# Patient Record
Sex: MALE | Race: WHITE
[De-identification: names, ages, dates, MRNs, and addresses within clinical notes are randomized per-mention and may not be internally consistent; named-entity substitution may affect disease eponyms.]

---

## 2018-04-28 ENCOUNTER — HOSPITAL ENCOUNTER (EMERGENCY)
Dept: HOSPITAL 89 - ER | Age: 43
LOS: 1 days | Discharge: ANOTHER HOSPITAL | End: 2018-04-29
Payer: SELF-PAY

## 2018-04-28 ENCOUNTER — HOSPITAL ENCOUNTER (OUTPATIENT)
Dept: HOSPITAL 89 - AMB | Age: 43
End: 2018-04-28
Payer: SELF-PAY

## 2018-04-28 DIAGNOSIS — D72.829: ICD-10-CM

## 2018-04-28 DIAGNOSIS — R94.31: ICD-10-CM

## 2018-04-28 DIAGNOSIS — N17.9: ICD-10-CM

## 2018-04-28 DIAGNOSIS — I46.9: Primary | ICD-10-CM

## 2018-04-28 DIAGNOSIS — I48.91: ICD-10-CM

## 2018-04-28 DIAGNOSIS — J96.90: Primary | ICD-10-CM

## 2018-04-28 DIAGNOSIS — Z86.711: ICD-10-CM

## 2018-04-28 LAB
INR PPP: 1.46
PLATELET COUNT, AUTOMATED: 64 K/UL (ref 150–450)

## 2018-04-28 PROCEDURE — 71045 X-RAY EXAM CHEST 1 VIEW: CPT

## 2018-04-28 PROCEDURE — 84484 ASSAY OF TROPONIN QUANT: CPT

## 2018-04-28 PROCEDURE — 80305 DRUG TEST PRSMV DIR OPT OBS: CPT

## 2018-04-28 PROCEDURE — 96375 TX/PRO/DX INJ NEW DRUG ADDON: CPT

## 2018-04-28 PROCEDURE — 82140 ASSAY OF AMMONIA: CPT

## 2018-04-28 PROCEDURE — 86920 COMPATIBILITY TEST SPIN: CPT

## 2018-04-28 PROCEDURE — 93005 ELECTROCARDIOGRAM TRACING: CPT

## 2018-04-28 PROCEDURE — 82374 ASSAY BLOOD CARBON DIOXIDE: CPT

## 2018-04-28 PROCEDURE — 82310 ASSAY OF CALCIUM: CPT

## 2018-04-28 PROCEDURE — 84460 ALANINE AMINO (ALT) (SGPT): CPT

## 2018-04-28 PROCEDURE — 36415 COLL VENOUS BLD VENIPUNCTURE: CPT

## 2018-04-28 PROCEDURE — 82435 ASSAY OF BLOOD CHLORIDE: CPT

## 2018-04-28 PROCEDURE — 85610 PROTHROMBIN TIME: CPT

## 2018-04-28 PROCEDURE — 85025 COMPLETE CBC W/AUTO DIFF WBC: CPT

## 2018-04-28 PROCEDURE — 82565 ASSAY OF CREATININE: CPT

## 2018-04-28 PROCEDURE — 84155 ASSAY OF PROTEIN SERUM: CPT

## 2018-04-28 PROCEDURE — 86850 RBC ANTIBODY SCREEN: CPT

## 2018-04-28 PROCEDURE — 83880 ASSAY OF NATRIURETIC PEPTIDE: CPT

## 2018-04-28 PROCEDURE — 86901 BLOOD TYPING SEROLOGIC RH(D): CPT

## 2018-04-28 PROCEDURE — 83690 ASSAY OF LIPASE: CPT

## 2018-04-28 PROCEDURE — 82803 BLOOD GASES ANY COMBINATION: CPT

## 2018-04-28 PROCEDURE — 85379 FIBRIN DEGRADATION QUANT: CPT

## 2018-04-28 PROCEDURE — 99285 EMERGENCY DEPT VISIT HI MDM: CPT

## 2018-04-28 PROCEDURE — 84295 ASSAY OF SERUM SODIUM: CPT

## 2018-04-28 PROCEDURE — 36600 WITHDRAWAL OF ARTERIAL BLOOD: CPT

## 2018-04-28 PROCEDURE — 87077 CULTURE AEROBIC IDENTIFY: CPT

## 2018-04-28 PROCEDURE — 87040 BLOOD CULTURE FOR BACTERIA: CPT

## 2018-04-28 PROCEDURE — 82150 ASSAY OF AMYLASE: CPT

## 2018-04-28 PROCEDURE — 99291 CRITICAL CARE FIRST HOUR: CPT

## 2018-04-28 PROCEDURE — 99292 CRITICAL CARE ADDL 30 MIN: CPT

## 2018-04-28 PROCEDURE — 82247 BILIRUBIN TOTAL: CPT

## 2018-04-28 PROCEDURE — 84520 ASSAY OF UREA NITROGEN: CPT

## 2018-04-28 PROCEDURE — 82375 ASSAY CARBOXYHB QUANT: CPT

## 2018-04-28 PROCEDURE — 87186 SC STD MICRODIL/AGAR DIL: CPT

## 2018-04-28 PROCEDURE — 83605 ASSAY OF LACTIC ACID: CPT

## 2018-04-28 PROCEDURE — 84075 ASSAY ALKALINE PHOSPHATASE: CPT

## 2018-04-28 PROCEDURE — 82550 ASSAY OF CK (CPK): CPT

## 2018-04-28 PROCEDURE — 84132 ASSAY OF SERUM POTASSIUM: CPT

## 2018-04-28 PROCEDURE — 84450 TRANSFERASE (AST) (SGOT): CPT

## 2018-04-28 PROCEDURE — 82274 ASSAY TEST FOR BLOOD FECAL: CPT

## 2018-04-28 PROCEDURE — 82040 ASSAY OF SERUM ALBUMIN: CPT

## 2018-04-28 PROCEDURE — 82947 ASSAY GLUCOSE BLOOD QUANT: CPT

## 2018-04-28 PROCEDURE — 96374 THER/PROPH/DIAG INJ IV PUSH: CPT

## 2018-04-28 PROCEDURE — 94002 VENT MGMT INPAT INIT DAY: CPT

## 2018-04-28 PROCEDURE — 80320 DRUG SCREEN QUANTALCOHOLS: CPT

## 2018-04-28 PROCEDURE — 84443 ASSAY THYROID STIM HORMONE: CPT

## 2018-04-28 PROCEDURE — 81001 URINALYSIS AUTO W/SCOPE: CPT

## 2018-04-28 PROCEDURE — 85730 THROMBOPLASTIN TIME PARTIAL: CPT

## 2018-04-28 PROCEDURE — 86900 BLOOD TYPING SEROLOGIC ABO: CPT

## 2018-04-28 NOTE — RADIOLOGY IMAGING REPORT
FACILITY: [facility] [place] 

 

PATIENT NAME: Parish Leal

: 1976

MR: [id number]

V: [id number]

EXAM DATE: 

ORDERING PHYSICIAN: ADAMA LIEBERMAN

TECHNOLOGIST: 

 

Location: [facility] [place]

Patient: Parish Leal

: 1976

MRN: [id number]

Visit/Account:[id number]

Date of Sevice:  2018

 

ACCESSION #: [id number]

 

CHEST SINGLE AP

 

Indication: Cardiac arrest

 

Comparison: None available

 

Findings:

There are numerous densities overlying the patient.

Given the limitations of the overlying densities, suspected atelectasis is seen within the right lung
 base. No infiltrate is seen.

No pneumothorax or definitive pleural effusion.

Heart size and mediastinal contours are mildly prominent which may reflect AP portable technique and 
small lung volumes.

The patient is intubated. Endotracheal tube tip is  6.3 cm above the marjorie.

 

IMPRESSION:

1. Limited exam due to multiple overlying densities. No gross infiltrate, effusion or pneumothorax.

2. Prominent heart and mediastinal contours may be related to portable AP technique and small lung vo
lumes.

 

Report Dictated By: Haseeb Martinez at 2018 10:51 PM

 

Report E-Signed By: Haseeb Martinez  at 2018 10:54 PM

 

WSN:[id number]

## 2018-04-28 NOTE — ER REPORT
History and Physical


Time Seen By MD:  22:26


HPI/ROS


CHIEF COMPLAINT: Cardiac arrest, found down unresponsive





HISTORY OF PRESENT ILLNESS: 42-year-old male with a history of blood clotts 

with shortness of breath for 2 days was having difficulty breathing.  Family 

reports patient had difficulty swallowing for the last 2 days.  Had very little 

by mouth intake.  EMS arrived, found him with agonal respirations and a brief 

episode of cardiac arrest.  They initiated CPR, intubated the patient.  They 

gave 3 rounds of epinephrine, dextrose 50% 1 amp and Narcan 2 mg IV.  On arrival

, patient appears mottled and cyanotic diffusely.  He has a rate of 144, and 

the monitor.  He is hypotensive.  EMS initiated a dopamine drip in the field.  

They did get her pressure up to 115/86.  Patient's had no purposeful movement.  

Patient has no previous records here at [facility].





REVIEW OF SYSTEMS:


Unable to obtain due to unresponsive patient.


Allergies:  


Coded Allergies:  


     Penicillins (Verified  Allergy, Unknown, 18)


Past Medical/Surgical History


Pulmonary embolism, toe amputation


Reviewed Nurses Notes:  Yes


Constitutional





Vital Sign - Last 24 Hours








 18





 22:26 22:27 22:28 22:29


 


Pulse 122 ??? ??? 122


 


Resp 14  10 14


 


B/P (MAP)  74/38 (50)  94/42 (59)





 18





 22:30 22:30 22:31 22:32


 


Pulse 134  108 133


 


Resp 11  9 12


 


B/P (MAP) 36/25 (29)   79/63 (68)


 


FiO2  90.0  





 18





 22:33 22:34 22:35 22:36


 


Pulse ??? 110 131 130


 


Resp 13 17 16 31


 


B/P (MAP)  141/97 (112)  





 18





 22:37 22:38 22:39 22:40


 


Pulse 113 105 ??? 110


 


Resp 12 11 13 13


 


B/P (MAP)    49/33 (38)





 4/28/18 4/28/18 4/28/18 4/28/18





 22:41 22:42 22:43 22:44


 


Pulse 112 112 118 93


 


Resp 16 21 15 15


 


B/P (MAP)   59/28 (38) ???/??? (1665)





 18





 22:45 22:46 22:47 22:48


 


Pulse 112 105 108 101


 


Resp 15 15 16 16


 


B/P (MAP) 76/42 (53)   





 18





 22:49 22:50 22:51 22:52


 


Pulse 120 105 101 105


 


Resp 16 17 16 18


 


B/P (MAP) 95/68 (77)   





 18





 22:53 22:54 22:55 22:56


 


Pulse 108 108 98 112


 


Resp 17 16 16 20


 


B/P (MAP)    87/62 (70)





 18





 22:57 22:58 22:59 23:00


 


Pulse 121 105 113 116


 


Resp 17 17 16 17


 


B/P (MAP)    81/37 (52)


 


Pulse Ox 99 99 99 100





 18





 23:01 23:02 23:03 23:04


 


Pulse 112 109 110 117


 


Resp 18 17 17 17


 


B/P (MAP) 92/59 (70)   86/35 (52)


 


Pulse Ox 99 99 98 97





 18





 23:05 23:06 23:07 23:08


 


Pulse 118 118 121 112


 


Resp 16 16 16 16


 


B/P (MAP)    59/46 (50)


 


Pulse Ox 96 96 96 95





 18





 23:09 23:10 23:11 23:12


 


Pulse 113 111 118 125


 


Resp 16 16 17 17


 


B/P (MAP) 117/49 (71)   85/45 (58)


 


Pulse Ox 94 94 93 92





 18





 23:13 23:14 23:15 23:16


 


Pulse 109 123 118 123


 


Resp 17 17 16 16


 


B/P (MAP)   98/62 (74) 


 


Pulse Ox 92 91 90 91





 18 18 18 18





 23:17 23:18 23:19 23:20


 


Pulse 108 131 115 172


 


Resp 16 17 17 17


 


B/P (MAP)    97/75 (82)


 


Pulse Ox 88 87 85 78





 18 1818 18





 23:21 23:22 23:23 23:24


 


Pulse ??? 116 105 129


 


Resp 17 16 18 19


 


B/P (MAP)    99/66 (77)


 


Pulse Ox 78   





 1818 18





 23:25 23:26 23:27 23:28


 


Pulse 111 112 111 140


 


Resp 18 19 19 18


 


B/P (MAP)   103/84 (90) 





 18





 23:29 23:30 23:31 23:32


 


Pulse 102 103 ??? 103


 


Resp 17 16 18 18


 


B/P (MAP)  77/62 (67) 87/65 (72) 


 


Pulse Ox   92 95





 1818 18 18





 23:33 23:34 23:35 23:36


 


Pulse 110 108 115 116


 


Resp 20 19 18 18


 


B/P (MAP) 87/66 (73)   91/45 (60)


 


Pulse Ox 94 92 92 94





 1818 18





 23:37 23:38 23:39 23:40


 


Pulse 106 113 118 ???


 


Resp 19 22 20 22


 


B/P (MAP)   80/60 (67) 


 


Pulse Ox 93 92 93 92





 1818 18





 23:41 23:42 23:43 23:44


 


Pulse ??? ??? ??? ???


 


Resp 21 20 20 20


 


B/P (MAP)  ???/??? (6015)  


 


Pulse Ox 92 90 91 89





 1818 18





 23:45 23:46 23:47 23:48


 


Pulse ??? ??? 116 117


 


Resp 22 19 20 20


 


B/P (MAP)    109/95 (100)


 


Pulse Ox 90 90 89 90





 18





 23:49 23:50 23:51 23:52


 


Pulse 118 110 126 ???


 


Resp 18 12 10 11


 


B/P (MAP)   126/113 (117) 


 


Pulse Ox 93 93 93 88





 18





 23:53 23:54 23:55 23:56


 


Pulse 115 119 120 119


 


Resp 20 10 20 35


 


B/P (MAP)  102/55 (71)  


 


Pulse Ox  95 96 96





 18





 23:57 23:58 23:59 00:00


 


Pulse 116 103 112 111


 


Resp 18 16 17 17


 


B/P (MAP) 93/60 (71)   113/92 (99)


 


Pulse Ox 95 95 95 96





 18





 00:02 00:03 00:04 00:05


 


Pulse 186 214 178 156


 


Resp 17 16 18 17


 


B/P (MAP)  100/40 (60)  


 


Pulse Ox 95 95 94 94





 18





 00:06 00:07 00:08 00:09


 


Pulse 123 124 112 


 


Resp 17 18 19 


 


B/P (MAP) 117/107 (110)   59/45 (50)


 


Pulse Ox 96 95 95 





 18





 00:10 00:11 00:12 00:15


 


Pulse 114 117 110 114


 


Resp 19 19 18 17


 


Pulse Ox 96 95 97 95





 18





 00:16 00:17 00:18 00:19


 


Pulse 116 110 111 113


 


Resp  19  19


 


B/P (MAP) 50/30 (37)   109/88 (95)


 


Pulse Ox  95  92





 18





 00:20 00:21 00:22 00:23


 


Pulse 104 105 99 


 


Resp 18  18 


 


B/P (MAP)    109/90 (96)


 


Pulse Ox 93  98 





 418





 00:24 00:25 00:27 00:29


 


Pulse 103 112 115 115


 


Resp 20 20 19 19


 


B/P (MAP)   97/75 (82) 


 


Pulse Ox 94 93 96 94





 18





 00:31 00:32 00:33 00:34


 


Pulse 114 138 113 117


 


Resp 20  20 18


 


B/P (MAP) 112/96 (101)  95/87 (90) 


 


Pulse Ox 96 96 96 97





 18





 00:35 00:37 00:39 00:40


 


Pulse 120 118 ??? 117


 


Resp 20 19 21 


 


Pulse Ox 96 96 96 





 18





 00:41 00:42 00:43 00:44


 


Pulse ??? 124 137 127


 


Resp 18 20 19 19


 


B/P (MAP) 95/69 (78)   


 


Pulse Ox 97 96 97 97





 18





 00:45 00:46 00:48 00:49


 


Pulse 126 133 128 114


 


Resp 21 21 20 21


 


B/P (MAP) 77/53 (61)  107/72 (84) 


 


Pulse Ox 99 97 97 97





 18





 00:50 00:51 00:52 00:53


 


Pulse 109 109 107 117


 


Resp 19 20 19 20


 


B/P (MAP)  127/116 (120)  


 


Pulse Ox 98 98 97 97





 18





 00:54 00:55 01:05 01:09


 


Pulse 134 ???  ???


 


B/P (MAP) 96/87 (90)  115/76 (89) 105/89 (94)


 


Pulse Ox 98 89  








Physical Exam


General Appearance: Unresponsive, intubated, placed on ventilator.  P


HEENT: pupils 4 mm and unresponsive.


Respiratory: Chest is non tender, lungs are clear to auscultation.  Breath 

sounds intact bilaterally.  On bagging


Cardiac: regular rate and rhythm, tachycardic, distant


Gastrointestinal: Abdomen is soft and non tender, no masses, bowel sounds 

normal.  No black melenic stools in groin area


Musculoskeletal:  Neck: Neck is supple and non tender.  No lymphadenopathy, no 

thyromegaly


Extremities have full range of motion and are non tender.


Skin: No rashes or lesions.





DIFFERENTIAL DIAGNOSIS: After history and physical exam differential diagnosis 

was considered for altered mental status including but not limited to 

hypoglycemia, infectious process, electrolyte abnormality, head injury and 

intoxicants.  Additionally,shortness of breath including but not limited to 

pulmonary infectious process, COPD, asthma, pulmonary embolus and congestive 

heart failure.





Medical Decision Making


Data Points


Result Diagram:  


18





Laboratory





Hematology








Test


  18


00:00 18


22:21 18


22:44 18


22:47


 


Stool Occult Blood (IFOB)


  Negative


(NEGATIVE) 


  


  


 


 


Amylase Level 66 U/L (0-110)    


 


Lipase


  2621 U/L


() 


  


  


 


 


Urine Opiates Screen Negative    


 


Urine Barbiturates Screen Negative    


 


Ur Tricyclic Antidepressants


Screen Negative 


  


  


  


 


 


Urine Phencyclidine Screen Negative    


 


Urine Amphetamines Screen Positive    


 


Urine Benzodiazepines Screen Negative    


 


Urine Cocaine Screen Negative    


 


Urine Cannabinoids Screen Positive    


 


Carboxyhemoglobin  1.6 % (< 5.0)   


 


Red Blood Count


  


  


  2.87 M/uL


(4.00-5.60) 


 


 


Mean Corpuscular Volume


  


  


  116.1 fL


(80.0-96.0) 


 


 


Mean Corpuscular Hemoglobin


  


  


  37.0 pg


(26.0-33.0) 


 


 


Mean Corpuscular Hemoglobin


Concent 


  


  31.9 g/dL


(32.0-36.0) 


 


 


Red Cell Distribution Width


  


  


  19.0 %


(11.5-14.5) 


 


 


Mean Platelet Volume


  


  


  10.4 fL


(7.2-11.1) 


 


 


Neutrophils (%) (Auto)    % (39.4-72.5)  


 


Lymphocytes (%) (Auto)    % (17.6-49.6)  


 


Monocytes (%) (Auto)    % (4.1-12.4)  


 


Eosinophils (%) (Auto)    % (0.4-6.7)  


 


Basophils (%) (Auto)    % (0.3-1.4)  


 


Nucleated RBC Relative Count


(auto) 


  


   /100WBC 


  


 


 


Neutrophils # (Auto)


  


  


  K/uL


(2.0-7.4) 


 


 


Lymphocytes # (Auto)


  


  


  K/uL


(1.3-3.6) 


 


 


Monocytes # (Auto)


  


  


  K/uL


(0.3-1.0) 


 


 


Eosinophils # (Auto)


  


  


  K/uL


(0.0-0.5) 


 


 


Basophils # (Auto)


  


  


  K/uL


(0.0-0.1) 


 


 


Nucleated RBC Absolute Count


(auto) 


  


   K/uL 


  


 


 


Neutrophils % (Manual)


  


  


  60 %


(39.4-72.5) 


 


 


Band Neutrophils %   11 %  


 


Lymphocytes % (Manual)


  


  


  11 %


(17.6-49.6) 


 


 


Monocytes % (Manual)   2 % (4.1-12.4)  


 


Eosinophils % (Manual)   2 % (0.4-6.7)  


 


Basophils % (Manual)   0 % (0.3-1.4)  


 


Metamyelocytes %   8 %  


 


Myelocytes %   6 %  


 


Nucleated Red Blood Cells   1  


 


Anisocytosis   1+  


 


Macrocytosis   2+  


 


Target Cells   1+  


 


Peripheral Blood Smear   Yes Y/N  


 


D-Dimer Quantitative (PE/DVT)


  


  


  7.47 ug/ml


(0-0.50) 


 


 


Sodium Level


  


  


  132 mmol/L


(137-145) 


 


 


Potassium Level


  


  


  3.3 mmol/L


(3.5-5.0) 


 


 


Chloride Level


  


  


  91 mmol/L


() 


 


 


Carbon Dioxide Level


  


  


  9 mmol/L


(22-30) 


 


 


Blood Urea Nitrogen


  


  


  183 mg/dl


(9-21) 


 


 


Creatinine


  


  


  4.00 mg/dl


(0.66-1.25) 


 


 


Glomerular Filtration Rate


Calc 


  


  16.5 


  


 


 


Random Glucose


  


  


  272 mg/dl


() 


 


 


Calcium Level


  


  


  7.5 mg/dl


(8.4-10.2) 


 


 


Total Bilirubin


  


  


  1.0 mg/dl


(0.2-1.3) 


 


 


Aspartate Amino Transf


(AST/SGOT) 


  


  126 U/L (0-35) 


  


 


 


Alanine Aminotransferase


(ALT/SGPT) 


  


  54 U/L (0-56) 


  


 


 


Alkaline Phosphatase


  


  


  237 U/L


(0-126) 


 


 


Ammonia


  


  


  27 UMOL/L


(9-33) 


 


 


Troponin I   < 0.012 ng/ml  


 


B-Type Natriuretic Peptide


  


  


  201 pg/ml


(0-100) 


 


 


Total Protein


  


  


  5.0 gm/dl


(6.3-8.2) 


 


 


Albumin


  


  


  1.9 g/dl


(3.5-5.0) 


 


 


Serum Alcohol   < 10 mg/dl  


 


Prothrombin Time


  


  


  


  18.0 seconds


(12.0-14.4)


 


Prothromb Time International


Ratio 


  


  


  1.46 


 


 


Activated Partial


Thromboplast Time 


  


  


  39 seconds


(23-35)


 


Lactate


  


  


  


  13.5 mmol/L


(0.7-2.1)


 


Total Creatine Kinase


  


  


  


  26 U/L


()


 


Test


  18


23:38 18


00:53 


  


 


 


Urine Color Yellow    


 


Urine Clarity Cloudy    


 


Urine pH


  5.0 pH


(4.8-9.5) 


  


  


 


 


Urine Specific Gravity 1.011    


 


Urine Protein


  30 mg/dL


(NEGATIVE) 


  


  


 


 


Urine Glucose (UA)


  Negative mg/dL


(NEGATIVE) 


  


  


 


 


Urine Ketones


  Negative mg/dL


(NEGATIVE) 


  


  


 


 


Urine Blood


  Moderate


(NEGATIVE) 


  


  


 


 


Urine Nitrite


  Negative


(NEGATIVE) 


  


  


 


 


Urine Bilirubin


  Negative


(NEGATIVE) 


  


  


 


 


Urine Urobilinogen


  2.0 mg/dL


(0.2-1.9) 


  


  


 


 


Urine Leukocyte Esterase


  Negative


(NEGATIVE) 


  


  


 


 


Urine RBC


  21 /HPF


(0-2/HPF) 


  


  


 


 


Urine WBC


  85 /HPF


(0-5/HPF) 


  


  


 


 


Urine Squamous Epithelial


Cells Many /LPF


(NONE-FEW) 


  


  


 


 


Urine Bacteria


  Negative /HPF


(NONE-FEW) 


  


  


 


 


Urine Hyaline Casts


  Many /LPF


(NONE-FEW) 


  


  


 


 


Urine Mucus


  Few /HPF


(NONE-FEW) 


  


  


 


 


Blood Gas Puncture Site  Femoral   


 


Blood Gas Patient Temperature  33.3 DEGREES   


 


Arterial Blood pH


  


  6.87


(7.35-7.45) 


  


 


 


Arterial Blood Partial


Pressure CO2 


  36 mmHg


(32-37) 


  


 


 


Arterial Blood Partial


Pressure O2 


  48 mmHg


(60-80) 


  


 


 


Arterial Blood HCO3


  


  7 mmol/L


(20-26) 


  


 


 


Arterial Blood Oxygen


Saturation 


  67 % () 


  


  


 


 


Arterial Blood Base Excess  -27.0 mmol/L   


 


Prem Test     


 


Oxygen Liters/Minute  90fio2   








Chemistry








Test


  18


00:00 18


22:21 18


22:44 18


22:47


 


Stool Occult Blood (IFOB)


  Negative


(NEGATIVE) 


  


  


 


 


Amylase Level 66 U/L (0-110)    


 


Lipase


  2621 U/L


() 


  


  


 


 


Urine Opiates Screen Negative    


 


Urine Barbiturates Screen Negative    


 


Ur Tricyclic Antidepressants


Screen Negative 


  


  


  


 


 


Urine Phencyclidine Screen Negative    


 


Urine Amphetamines Screen Positive    


 


Urine Benzodiazepines Screen Negative    


 


Urine Cocaine Screen Negative    


 


Urine Cannabinoids Screen Positive    


 


Carboxyhemoglobin  1.6 % (< 5.0)   


 


White Blood Count


  


  


  27.4 k/uL


(4.5-11.0) 


 


 


Red Blood Count


  


  


  2.87 M/uL


(4.00-5.60) 


 


 


Hemoglobin


  


  


  10.6 g/dL


(14.0-18.0) 


 


 


Hematocrit


  


  


  33.3 %


(42.0-52.0) 


 


 


Mean Corpuscular Volume


  


  


  116.1 fL


(80.0-96.0) 


 


 


Mean Corpuscular Hemoglobin


  


  


  37.0 pg


(26.0-33.0) 


 


 


Mean Corpuscular Hemoglobin


Concent 


  


  31.9 g/dL


(32.0-36.0) 


 


 


Red Cell Distribution Width


  


  


  19.0 %


(11.5-14.5) 


 


 


Platelet Count


  


  


  64 K/uL


(150-450) 


 


 


Mean Platelet Volume


  


  


  10.4 fL


(7.2-11.1) 


 


 


Neutrophils (%) (Auto)    % (39.4-72.5)  


 


Lymphocytes (%) (Auto)    % (17.6-49.6)  


 


Monocytes (%) (Auto)    % (4.1-12.4)  


 


Eosinophils (%) (Auto)    % (0.4-6.7)  


 


Basophils (%) (Auto)    % (0.3-1.4)  


 


Nucleated RBC Relative Count


(auto) 


  


   /100WBC 


  


 


 


Neutrophils # (Auto)


  


  


  K/uL


(2.0-7.4) 


 


 


Lymphocytes # (Auto)


  


  


  K/uL


(1.3-3.6) 


 


 


Monocytes # (Auto)


  


  


  K/uL


(0.3-1.0) 


 


 


Eosinophils # (Auto)


  


  


  K/uL


(0.0-0.5) 


 


 


Basophils # (Auto)


  


  


  K/uL


(0.0-0.1) 


 


 


Nucleated RBC Absolute Count


(auto) 


  


   K/uL 


  


 


 


Neutrophils % (Manual)


  


  


  60 %


(39.4-72.5) 


 


 


Band Neutrophils %   11 %  


 


Lymphocytes % (Manual)


  


  


  11 %


(17.6-49.6) 


 


 


Monocytes % (Manual)   2 % (4.1-12.4)  


 


Eosinophils % (Manual)   2 % (0.4-6.7)  


 


Basophils % (Manual)   0 % (0.3-1.4)  


 


Metamyelocytes %   8 %  


 


Myelocytes %   6 %  


 


Nucleated Red Blood Cells   1  


 


Anisocytosis   1+  


 


Macrocytosis   2+  


 


Target Cells   1+  


 


Peripheral Blood Smear   Yes Y/N  


 


D-Dimer Quantitative (PE/DVT)


  


  


  7.47 ug/ml


(0-0.50) 


 


 


Glomerular Filtration Rate


Calc 


  


  16.5 


  


 


 


Calcium Level


  


  


  7.5 mg/dl


(8.4-10.2) 


 


 


Total Bilirubin


  


  


  1.0 mg/dl


(0.2-1.3) 


 


 


Aspartate Amino Transf


(AST/SGOT) 


  


  126 U/L (0-35) 


  


 


 


Alanine Aminotransferase


(ALT/SGPT) 


  


  54 U/L (0-56) 


  


 


 


Alkaline Phosphatase


  


  


  237 U/L


(0-126) 


 


 


Ammonia


  


  


  27 UMOL/L


(9-33) 


 


 


Troponin I   < 0.012 ng/ml  


 


B-Type Natriuretic Peptide


  


  


  201 pg/ml


(0-100) 


 


 


Total Protein


  


  


  5.0 gm/dl


(6.3-8.2) 


 


 


Albumin


  


  


  1.9 g/dl


(3.5-5.0) 


 


 


Serum Alcohol   < 10 mg/dl  


 


Prothrombin Time


  


  


  


  18.0 seconds


(12.0-14.4)


 


Prothromb Time International


Ratio 


  


  


  1.46 


 


 


Activated Partial


Thromboplast Time 


  


  


  39 seconds


(23-35)


 


Lactate


  


  


  


  13.5 mmol/L


(0.7-2.1)


 


Total Creatine Kinase


  


  


  


  26 U/L


()


 


Test


  18


23:38 18


00:53 


  


 


 


Urine Color Yellow    


 


Urine Clarity Cloudy    


 


Urine pH


  5.0 pH


(4.8-9.5) 


  


  


 


 


Urine Specific Gravity 1.011    


 


Urine Protein


  30 mg/dL


(NEGATIVE) 


  


  


 


 


Urine Glucose (UA)


  Negative mg/dL


(NEGATIVE) 


  


  


 


 


Urine Ketones


  Negative mg/dL


(NEGATIVE) 


  


  


 


 


Urine Blood


  Moderate


(NEGATIVE) 


  


  


 


 


Urine Nitrite


  Negative


(NEGATIVE) 


  


  


 


 


Urine Bilirubin


  Negative


(NEGATIVE) 


  


  


 


 


Urine Urobilinogen


  2.0 mg/dL


(0.2-1.9) 


  


  


 


 


Urine Leukocyte Esterase


  Negative


(NEGATIVE) 


  


  


 


 


Urine RBC


  21 /HPF


(0-2/HPF) 


  


  


 


 


Urine WBC


  85 /HPF


(0-5/HPF) 


  


  


 


 


Urine Squamous Epithelial


Cells Many /LPF


(NONE-FEW) 


  


  


 


 


Urine Bacteria


  Negative /HPF


(NONE-FEW) 


  


  


 


 


Urine Hyaline Casts


  Many /LPF


(NONE-FEW) 


  


  


 


 


Urine Mucus


  Few /HPF


(NONE-FEW) 


  


  


 


 


Blood Gas Puncture Site  Femoral   


 


Blood Gas Patient Temperature  33.3 DEGREES   


 


Arterial Blood pH


  


  6.87


(7.35-7.45) 


  


 


 


Arterial Blood Partial


Pressure CO2 


  36 mmHg


(32-37) 


  


 


 


Arterial Blood Partial


Pressure O2 


  48 mmHg


(60-80) 


  


 


 


Arterial Blood HCO3


  


  7 mmol/L


(20-26) 


  


 


 


Arterial Blood Oxygen


Saturation 


  67 % () 


  


  


 


 


Arterial Blood Base Excess  -27.0 mmol/L   


 


Prem Test     


 


Oxygen Liters/Minute  90fio2   








Coagulation








Test


  18


22:44 18


22:47


 


D-Dimer Quantitative (PE/DVT) 7.47 ug/ml  


 


Prothrombin Time  18.0 seconds 


 


Prothromb Time International


Ratio 


  1.46 


 


 


Activated Partial


Thromboplast Time 


  39 seconds 


 








Toxicology








Test


  18


00:00 18


22:44


 


Urine Opiates Screen Negative  


 


Urine Barbiturates Screen Negative  


 


Ur Tricyclic Antidepressants


Screen Negative 


  


 


 


Urine Phencyclidine Screen Negative  


 


Urine Amphetamines Screen Positive  


 


Urine Benzodiazepines Screen Negative  


 


Urine Cocaine Screen Negative  


 


Urine Cannabinoids Screen Positive  


 


Serum Alcohol  < 10 mg/dl 








Urinalysis








Test


  18


23:38


 


Urine Color Yellow 


 


Urine Clarity Cloudy 


 


Urine pH


  5.0 pH


(4.8-9.5)


 


Urine Specific Gravity 1.011 


 


Urine Protein


  30 mg/dL


(NEGATIVE)


 


Urine Glucose (UA)


  Negative mg/dL


(NEGATIVE)


 


Urine Ketones


  Negative mg/dL


(NEGATIVE)


 


Urine Blood


  Moderate


(NEGATIVE)


 


Urine Nitrite


  Negative


(NEGATIVE)


 


Urine Bilirubin


  Negative


(NEGATIVE)


 


Urine Urobilinogen


  2.0 mg/dL


(0.2-1.9)


 


Urine Leukocyte Esterase


  Negative


(NEGATIVE)


 


Urine RBC


  21 /HPF


(0-2/HPF)


 


Urine WBC


  85 /HPF


(0-5/HPF)


 


Urine Squamous Epithelial


Cells Many /LPF


(NONE-FEW)


 


Urine Bacteria


  Negative /HPF


(NONE-FEW)


 


Urine Hyaline Casts


  Many /LPF


(NONE-FEW)


 


Urine Mucus


  Few /HPF


(NONE-FEW)











EKG/Imaging


EKG Interpretation


12 lead EK


      Rhythm: Atrial fibrillation with a rate of 107


      Axis: normal 


      QRS:?  Old anterior Q waves


      ST segments: Nonspecific ST and T-wave changes, no old EKGs for comparison


Imaging


X-ray: Single view portable chest x-ray was obtained.  I viewed the images 

myself on the PACS system.  My interpretation of the images is: Clear lung 

fields, borderline cardiomegaly, significant artifact from board and Ronaldo 

being on the patient.  The radiologist interpretation had no clinically 

significant variation from this interpretation.





X-ray: Single view portable chest x-ray was obtained.  I viewed the images 

myself on the PACS system.  My interpretation of the images is: Clear lung 

fields, ET tube at 7 mm above the marjorie.  The radiologist interpretation had 

no clinically significant variation from this interpretation.





ED Course/Re-evaluation


Clinical Indication for ER IV:  Hydration, IV Access


ED Course


Patient was admitted to an examination room by EMS with intubated on a dopamine 

drip with a stable blood pressure and rhythm.  Patient had interosseus IVs in 

his tibias bilaterally.  Patient was aggressively fluid resuscitated.  

Peripheral IVs were established.  Diagnostic studies were ordered.  A portal 

chest x-ray was performed.  Confirmation of tube placement was confirmed.  

Patient's blood pressure continued to Trail off despite aggressive fluid 

resuscitation and Levothroid was started at 10 mcg/m.  Diagnostic studies began 

to return with elevated white blood cell count of 27,000.  His H&H were 10 and 

33, lactate returned at 13.5.  BUN/creatinine 183 and creatinine of 4.0.  An 

ABG was obtained.  His pH was 6.90, PCO2 of 23 8, PaO2 of 375.





 2018 12:10:33 am case discussed with Dr. Dilip Ortiz at [facility] who accepts 

the patient for transfer to his facility.


Decision to Disposition Date:  2018


Decision to Disposition Time:  23:46


Critical Care Time


I spent a total of 120 minutes of critical care time in obtaining history, 

performing a physical exam, bedside monitoring of interventions, collecting and 

interpreting tests and discussion with consultants but not including time spent 

performing procedures.





Depart


Departure


Latest Vital Signs





Vital Signs








  Date Time  Temp Pulse Resp B/P (MAP) Pulse Ox O2 Delivery O2 Flow Rate FiO2


 


18 01:09  ???  105/89 (94)    


 


18 00:55     89   


 


18 00:53   20     


 


18 22:30        90.0








Impression:  


 Primary Impression:  


 Cardiac arrest


 Additional Impressions:  


 History of pulmonary embolism


 Acute renal failure


 Leukocytosis


Condition:  Improved


Disposition:  XFER TO [facility]





Problem Qualifiers











ADAMA LIEBERMAN DO 2018 22:55

## 2018-04-28 NOTE — RADIOLOGY IMAGING REPORT
[place] [facility] [place] 

 

PATIENT NAME: Parish Leal

: 1976

MR: [id number]

V: [id number]

EXAM DATE: 

ORDERING PHYSICIAN: ADAMA LIEBERMAN

TECHNOLOGIST: 

 

Location: [facility]

Patient: Parish Leal

: 1976

MRN: [id number]

Visit/Account:[id number]

Date of Sevice:  2018

 

ACCESSION #: [id number]

 

CHEST SINGLE AP 2018 23:12 hours.

 

HISTORY: Cardiac arrest. CODE BLUE.

 

COMPARISON: 2018 at 2214 hours hours.

 

TECHNIQUE: Portable AP view of the chest.

 

FINDINGS:

 

Tubes/lines/hardware: ET tube terminates 7 cm above the marjorie. There are external chest leads. There
 is artifact from a backboard or something external to the patient.

 

Pulmonary: Lungs are clear. There is no pneumothorax or pleural effusion.

 

Cardiomediastinal: Cardiac and mediastinal silhouettes are normal for supine AP x-ray.

 

Bones/soft tissues: No acute osseous abnormality. The visible abdomen is normal.

 

IMPRESSION:

1. Endotracheal tube terminates 7 cm above the marjorie and could be advanced 2 cm.

2. No acute cardiopulmonary process.

3. Artifact from structures external to the patient.

 

Report Dictated By: Emily Casey at 2018 11:22 PM

 

Report E-Signed By: Emily Casey  at 2018 11:24 PM

 

WSN:[id number]

## 2018-04-29 ENCOUNTER — HOSPITAL ENCOUNTER (OUTPATIENT)
Dept: HOSPITAL 89 - AMB | Age: 43
End: 2018-04-29

## 2018-04-29 VITALS — SYSTOLIC BLOOD PRESSURE: 105 MMHG | DIASTOLIC BLOOD PRESSURE: 89 MMHG

## 2018-04-29 DIAGNOSIS — Z02.9: Primary | ICD-10-CM

## 2018-04-29 NOTE — EKG
FACILITY: [facility] 

 

PATIENT NAME: ISABEL THOMSON

: [id number]

MR: [id number]

V: [id number]

EXAM DATE: 

ORDERING PHYSICIAN: ADAMA LIEBERMAN

TECHNOLOGIST: Gene Jovel Reason : 

Blood Pressure : ***/*** mmHG

Vent. Rate : 107 BPM     Atrial Rate : 105 BPM

   P-R Int : 000 ms          QRS Dur : 096 ms

    QT Int : 420 ms       P-R-T Axes : [id number] degrees

   QTc Int : 560 ms

 

Atrial fibrillation with rapid ventricular response with premature ventricular or aberrantly conducte
d complexes

Artifact in several leads - repeat if needed

Abnormal ECG

 

Confirmed by DELIA BOWER ([id number]) on 2018 5:51:36 AM

 

Referred By:             Confirmed By:DELIA BOWER